# Patient Record
Sex: FEMALE | Race: WHITE | NOT HISPANIC OR LATINO | URBAN - METROPOLITAN AREA
[De-identification: names, ages, dates, MRNs, and addresses within clinical notes are randomized per-mention and may not be internally consistent; named-entity substitution may affect disease eponyms.]

---

## 2017-02-13 ENCOUNTER — IMPORTED ENCOUNTER (OUTPATIENT)
Dept: URBAN - METROPOLITAN AREA CLINIC 43 | Facility: CLINIC | Age: 77
End: 2017-02-13

## 2017-02-13 PROBLEM — H26.492: Noted: 2017-02-13

## 2017-02-13 PROBLEM — E11.9: Noted: 2017-02-13

## 2017-02-13 PROBLEM — Z96.1: Noted: 2017-02-13

## 2017-02-13 PROBLEM — H43.812: Noted: 2017-02-13

## 2017-02-13 PROBLEM — H01.004: Noted: 2017-02-13

## 2017-02-13 PROBLEM — H01.001: Noted: 2017-02-13

## 2017-02-13 PROBLEM — H35.3131: Noted: 2017-02-13

## 2017-03-13 ENCOUNTER — IMPORTED ENCOUNTER (OUTPATIENT)
Dept: URBAN - METROPOLITAN AREA CLINIC 43 | Facility: CLINIC | Age: 77
End: 2017-03-13

## 2017-03-13 PROBLEM — H26.492: Noted: 2017-03-13

## 2017-03-13 PROBLEM — H26.491: Noted: 2017-03-13

## 2017-03-14 ENCOUNTER — IMPORTED ENCOUNTER (OUTPATIENT)
Dept: URBAN - METROPOLITAN AREA CLINIC 43 | Facility: CLINIC | Age: 77
End: 2017-03-14

## 2017-08-02 ENCOUNTER — IMPORTED ENCOUNTER (OUTPATIENT)
Dept: URBAN - METROPOLITAN AREA CLINIC 43 | Facility: CLINIC | Age: 77
End: 2017-08-02

## 2018-02-07 ENCOUNTER — IMPORTED ENCOUNTER (OUTPATIENT)
Dept: URBAN - METROPOLITAN AREA CLINIC 43 | Facility: CLINIC | Age: 78
End: 2018-02-07

## 2018-02-07 PROBLEM — H02.834: Noted: 2018-02-07

## 2018-02-07 PROBLEM — H16.223: Noted: 2018-02-07

## 2018-02-07 PROBLEM — H35.3131: Noted: 2018-02-07

## 2018-02-07 PROBLEM — H02.831: Noted: 2018-02-07

## 2018-02-07 PROBLEM — H43.812: Noted: 2018-02-07

## 2018-02-07 PROBLEM — E11.9: Noted: 2018-02-07

## 2019-02-07 ENCOUNTER — IMPORTED ENCOUNTER (OUTPATIENT)
Dept: URBAN - METROPOLITAN AREA CLINIC 43 | Facility: CLINIC | Age: 79
End: 2019-02-07

## 2019-02-07 PROBLEM — E11.9: Noted: 2019-02-07

## 2019-02-07 PROBLEM — H35.3131: Noted: 2019-02-07

## 2019-02-07 PROBLEM — H16.223: Noted: 2019-02-07

## 2019-02-07 PROBLEM — H02.834: Noted: 2019-02-07

## 2019-02-07 PROBLEM — Z96.1: Noted: 2019-02-07

## 2019-02-07 PROBLEM — H02.831: Noted: 2019-02-07

## 2019-05-10 NOTE — PATIENT DISCUSSION
C70.8079  Primary open-angle glaucoma right eye moderate stage  OD Assessment:  Examination revealed Primary Open Angle Glaucoma. OCT ONH performed: stable again today. Pt accompanied to appt today by son. Plan:  Monitor for IOP and NFL changes with visits and OCTs. Continue Lumigan 0.01% OU qhs and Azopt bid OU. Orders written for care facility; given to Pt. RTC in 6 months for IOP Check and OCT ONH, sooner if problems or changes.

## 2019-05-10 NOTE — PATIENT DISCUSSION
H53.2  Diplopia Assessment:  Examination revealed diplopia. Intermittent and only with reading. Plan:  Monitor. Recommended Pt return to see [facility] for evaluation of Diplopia. Recommended Pt to increase ATs to tid to see if improves diplopia. Pt can cancel appt with [facility] if diplopia resolves with increased AT use. RTC next week for Diplopia Refraction with [facility].

## 2019-05-10 NOTE — PATIENT DISCUSSION
I83.8541  Primary open-angle glaucoma left eye mild stage  OS Assessment:  Examination revealed Primary Open Angle Glaucoma. OCT ONH performed: stable again today. Plan:  Monitor for IOP and NFL changes with visits and OCTs. Continue Lumigan 0.01% OU qhs and Azopt bid OU. Orders written for care facility; given to Pt. RTC in 6 months for IOP Check and OCT ONH, sooner if problems or changes.

## 2019-05-10 NOTE — PATIENT DISCUSSION
H26.491  Other secondary cataract, right eye  OD Assessment:  Mild and patchy posterior capsule opacification present OD. Plan:  Monitor for changes. Advised patient to call our office with decreased vision or increased symptoms.

## 2019-05-10 NOTE — PATIENT DISCUSSION
L15.249  Keratoconjunct sicca, not specified as Sjogren's, bilateral  OU Assessment:  Examination revealed Dry Eye Syndrome Plan:  Monitor for changes. Recommended increasing ATs to 3 times per day and prn. Recommended Systane or Refresh. Refresh coupon given today. Written orders given to Pt to take to care facility.

## 2019-11-13 NOTE — PATIENT DISCUSSION
Monitor for macular changes. Eat healthy and wear sunglasses. Advised patient to call our office with decreased vision or increased symptoms.

## 2020-04-19 ASSESSMENT — VISUAL ACUITY
OD_SC: 20/20 -2
OD_CC: J1+
OS_CC: 20/20-2
OD_OTHER: 20/40.
OD_SC: 20/20 -2
OD_SC: 20/20
OS_CC: 20/20 -2
OS_CC: J1+ @15"
OS_SC: 20/25 -2
OS_SC: J7
OD_SC: 20/20
OD_CC: 20/25 +2
OS_CC: J2+
OD_SC: J10
OD_OTHER: 20/200.
OS_SC: 20/20
OD_CC: J1+ @15"
OS_CC: J1
OS_CC: 20/20 -3
OS_SC: 20/20
OS_CC: J1+
OS_OTHER: 20/60.
OD_CC: 20/20
OD_CC: J1+
OS_CC: 20/20
OD_CC: J1+
OS_CC: 20/25
OD_CC: 20/20 -1
OD_CC: 20/25 +1
OD_CC: 20/20
OS_SC: 20/30

## 2020-04-19 ASSESSMENT — KERATOMETRY
OD_AXISANGLE_DEGREES: 7
OS_K2POWER_DIOPTERS: 43.75
OS_K1POWER_DIOPTERS: 45
OD_K1POWER_DIOPTERS: 45.5
OD_AXISANGLE2_DEGREES: 97
OS_K1POWER_DIOPTERS: 45.25
OD_K1POWER_DIOPTERS: 45.5
OS_AXISANGLE_DEGREES: 165
OS_AXISANGLE_DEGREES: 163
OS_AXISANGLE2_DEGREES: 73
OD_AXISANGLE2_DEGREES: 90
OD_K2POWER_DIOPTERS: 44
OD_K2POWER_DIOPTERS: 44.25
OD_AXISANGLE_DEGREES: 180
OS_K2POWER_DIOPTERS: 43.5
OS_AXISANGLE2_DEGREES: 75

## 2020-04-19 ASSESSMENT — TONOMETRY
OS_IOP_MMHG: 11.0
OD_IOP_MMHG: 15.0
OD_IOP_MMHG: 13.0
OS_IOP_MMHG: 14.0
OS_IOP_MMHG: 13.0
OD_IOP_MMHG: 13.0
OS_IOP_MMHG: 13.0
OD_IOP_MMHG: 11.0

## 2020-10-21 NOTE — PATIENT DISCUSSION
Monitor for macular changes. Eat healthy and wear sunglasses. Advised patient to call our office with decreased vision or distortion.